# Patient Record
Sex: MALE | Race: BLACK OR AFRICAN AMERICAN | Employment: FULL TIME | URBAN - METROPOLITAN AREA
[De-identification: names, ages, dates, MRNs, and addresses within clinical notes are randomized per-mention and may not be internally consistent; named-entity substitution may affect disease eponyms.]

---

## 2018-02-10 ENCOUNTER — HOSPITAL ENCOUNTER (EMERGENCY)
Age: 33
Discharge: HOME | End: 2018-02-10
Attending: EMERGENCY MEDICINE
Payer: SELF-PAY

## 2018-02-10 ENCOUNTER — APPOINTMENT (OUTPATIENT)
Dept: GENERAL RADIOLOGY | Age: 33
End: 2018-02-10
Attending: PHYSICIAN ASSISTANT
Payer: SELF-PAY

## 2018-02-10 VITALS
OXYGEN SATURATION: 99 % | WEIGHT: 270.95 LBS | RESPIRATION RATE: 18 BRPM | HEART RATE: 79 BPM | BODY MASS INDEX: 36.7 KG/M2 | TEMPERATURE: 97.7 F | HEIGHT: 72 IN | DIASTOLIC BLOOD PRESSURE: 78 MMHG | SYSTOLIC BLOOD PRESSURE: 166 MMHG

## 2018-02-10 DIAGNOSIS — M54.42 ACUTE LEFT-SIDED LOW BACK PAIN WITH LEFT-SIDED SCIATICA: Primary | ICD-10-CM

## 2018-02-10 PROCEDURE — 99283 EMERGENCY DEPT VISIT LOW MDM: CPT

## 2018-02-10 PROCEDURE — 73502 X-RAY EXAM HIP UNI 2-3 VIEWS: CPT

## 2018-02-10 PROCEDURE — 74011250637 HC RX REV CODE- 250/637: Performed by: PHYSICIAN ASSISTANT

## 2018-02-10 RX ORDER — OXYCODONE AND ACETAMINOPHEN 5; 325 MG/1; MG/1
1 TABLET ORAL
Qty: 10 TAB | Refills: 0 | Status: SHIPPED | OUTPATIENT
Start: 2018-02-10 | End: 2018-02-13

## 2018-02-10 RX ORDER — KETOROLAC TROMETHAMINE 10 MG/1
10 TABLET, FILM COATED ORAL
Qty: 20 TAB | Refills: 0 | Status: SHIPPED | OUTPATIENT
Start: 2018-02-10

## 2018-02-10 RX ORDER — METHOCARBAMOL 500 MG/1
500 TABLET, FILM COATED ORAL 4 TIMES DAILY
Qty: 20 TAB | Refills: 0 | Status: SHIPPED | OUTPATIENT
Start: 2018-02-10

## 2018-02-10 RX ORDER — ACETAMINOPHEN 325 MG/1
650 TABLET ORAL
Status: COMPLETED | OUTPATIENT
Start: 2018-02-10 | End: 2018-02-10

## 2018-02-10 RX ADMIN — ACETAMINOPHEN 650 MG: 325 TABLET ORAL at 11:56

## 2018-02-10 NOTE — ED NOTES
Patient ambulatory to the ER c/o L hip pain that is worse when he is sitting. Denies injury. Pain radiates down his left leg.

## 2018-02-10 NOTE — DISCHARGE INSTRUCTIONS
Thank you for allowing us to provide you with excellent care today. We hope we addressed all of your concerns and needs. We strive to provide excellent quality care in the Emergency Department. Please rate us as excellent, as anything less than excellent does not meet our expectations. If you feel that you have not received excellent quality care or timely care, please ask to speak to the nurse manager. Please choose us in the future for your continued health care needs. The exam and treatment you received in the Emergency Department were for an urgent problem and are not intended as complete care. It is important that you follow-up with a doctor, nurse practitioner, or physician assistant to:  (1) confirm your diagnosis,  (2) re-evaluation of changes in your illness and treatment, and  (3) for ongoing care. If your symptoms become worse or you do not improve as expected and you are unable to reach your usual health care provider, you should return to the Emergency Department. We are available 24 hours a day. Take this sheet with you when you go to your follow-up visit. If you have any problem arranging the follow-up visit, contact [address] [place] [address] 21 [phone])    Make an appointment with your Primary Care doctor for follow up of this visit. Return to the ER if you are unable to be seen in the time recommended on your discharge instructions.

## 2018-02-10 NOTE — ED PROVIDER NOTES
EMERGENCY DEPARTMENT HISTORY AND PHYSICAL EXAM      Date: 2/10/2018  Patient Name: Kamron Uribe    History of Presenting Illness     Chief Complaint   Patient presents with    Leg Pain     Pt presents to ED for L. hip pain that radiates to buttocks x yesterday, worse today       History Provided By: Patient    HPI: Kamron Uribe, 29 y.o. male, presents ambulatory to the ED with cc of 10/10 throbbing left hip pain that began yesterday while he was at work but worsened today. The pt states that he tried resting his hip last night in hopes of alleviating the pain but notes that it did not help. Pt reports that he woke up this morning with exacerbation of his hip pain. He tried going to work and decided that his pain was unbearable so he came in to the ED. He states that his hip pain radiates down his left thigh and reports experiencing intermittent numbness and tingling in the hip as well. He notes that sitting down worsens his pain. He reports taking some Advil this morning with no relief of his sx. He denies any recent injuries or falls, However, he states that he does perform a lot of heavy lifting at work, drives long distances regularly, and crawls into tight crawl spaces. The pt denies having ever experienced similar sx before. He also denies hematuria, dysuria, Hx of back injuries, taking any medications on a daily basis, Hx of sciatica, or urinary/bowel incontinence. PCP: None    There are no other complaints, changes, or physical findings at this time. Current Outpatient Prescriptions   Medication Sig Dispense Refill    oxyCODONE-acetaminophen (PERCOCET) 5-325 mg per tablet Take 1 Tab by mouth every six (6) hours as needed for Pain for up to 3 days. Max Daily Amount: 4 Tabs. 10 Tab 0    methocarbamol (ROBAXIN) 500 mg tablet Take 1 Tab by mouth four (4) times daily. 20 Tab 0    ketorolac (TORADOL) 10 mg tablet Take 1 Tab by mouth every six (6) hours as needed for Pain.  20 Tab 0       Past History     Past Medical History:  History reviewed. No pertinent past medical history. Past Surgical History:  History reviewed. No pertinent surgical history. Family History:  History reviewed. No pertinent family history. Social History:  Social History   Substance Use Topics    Smoking status: Never Smoker    Smokeless tobacco: Never Used    Alcohol use No       Allergies:  No Known Allergies      Review of Systems   Review of Systems   Constitutional: Negative for chills, diaphoresis and fever. HENT: Negative for rhinorrhea and sore throat. Eyes: Negative for pain. Respiratory: Negative for shortness of breath, wheezing and stridor. Cardiovascular: Negative for chest pain and leg swelling. Gastrointestinal: Negative for abdominal pain, diarrhea, nausea and vomiting. Genitourinary: Negative for difficulty urinating, dysuria, flank pain and hematuria. Musculoskeletal: Positive for arthralgias and myalgias. Negative for back pain and neck stiffness. Skin: Negative for rash. Neurological: Positive for numbness. Negative for dizziness, seizures, syncope, weakness, light-headedness and headaches. Psychiatric/Behavioral: Negative for behavioral problems and confusion. All other systems reviewed and are negative. Physical Exam   Physical Exam   Constitutional: He is oriented to person, place, and time. He appears well-developed and well-nourished. No distress. HENT:   Head: Normocephalic and atraumatic. Right Ear: External ear normal.   Left Ear: External ear normal.   Nose: Nose normal.   Eyes: Conjunctivae and EOM are normal. Right eye exhibits no discharge. Left eye exhibits no discharge. No scleral icterus. Neck: Normal range of motion. Neck supple. Cardiovascular: Normal rate, regular rhythm and normal heart sounds. No murmur heard. Pulmonary/Chest: Effort normal and breath sounds normal. No stridor. No respiratory distress. He has no decreased breath sounds.  He has no wheezes. Abdominal: Soft. Bowel sounds are normal. He exhibits no distension. There is no tenderness. There is no rebound. Musculoskeletal: Normal range of motion. He exhibits no edema or tenderness. Left hip: He exhibits normal range of motion (full ROM). Left knee: He exhibits normal range of motion (full ROM). BACK: Normal spinal curvatures. No step off or deformity. NT to palpation along midline. Tenderness over the left SI joint, radiating down the left buttocks. Negative seated SLR bilaterally. Strength of the LE 5/5 and equal bilaterally. Ambulatory without difficulty. Neurological: He is alert and oriented to person, place, and time. No focal neuro deficits. NVI. Neurologically intact of UE and LE B/L  Sensation intact and symmetrical of UE and LE B/L. Strength 5/5 of UE B/L, Strength 5/5 of LE B/L. Symmetric bulk and tone of LE muscle groups. Skin: Skin is warm and dry. No rash noted. He is not diaphoretic. Psychiatric: He has a normal mood and affect. Judgment normal.   Nursing note and vitals reviewed. Diagnostic Study Results     Radiologic Studies -   XR HIP LT W OR WO PELV 2-3 VWS   Final Result      EXAM:  XR HIP LT W OR WO PELV 2-3 VWS  Clinical history: Sciatica, left hip pain  INDICATION:   sciatica, l hip pain.     COMPARISON: None.     FINDINGS: An AP view of the pelvis and a frogleg lateral view of the left hip  demonstrate no fracture, dislocation or other acute abnormality.     IMPRESSION  IMPRESSION:  No acute abnormality.         Medical Decision Making   I am the first provider for this patient. I reviewed the vital signs, available nursing notes, past medical history, past surgical history, family history and social history. Vital Signs-Reviewed the patient's vital signs.   Patient Vitals for the past 12 hrs:   Temp Pulse Resp BP SpO2   02/10/18 1103 97.7 °F (36.5 °C) 79 18 166/78 99 %     Records Reviewed: Nursing Notes and Old Medical Records    Provider Notes (Medical Decision Making):   DDX: strain, sprain, contusion, fracture, sciatica, spinal spondylosis vs spondylolisthesis, spinal stenosis. Low concern for meningitis or other infectious cause. Mechanical mechanism; reassuring exam    Patient denies fever, unexplained weight loss, specific trauma, abdominal pain, chest pain, shortness of breath, saddle anesthesia, loss of bowel or bladder, weakness, numbness or radiating pain. History and physical do not suggest infectious, neoplastic, abdominal, genitourinary, cardiopulmonary or progressive neurological etiology of back pain. Pain medication. Orthopedics/PCP referral.   Return precautions. ED Course:   Initial assessment performed. The patients presenting problems have been discussed, and they are in agreement with the care plan formulated and outlined with them. I have encouraged them to ask questions as they arise throughout their visit. 12:06 PM  Eladio Sanchez's final results have been reviewed with him. He has been counseled regarding his diagnosis. He verbally conveys understanding and agreement of the signs, symptoms, diagnosis, treatment and prognosis . Disposition:  DISCHARGE NOTE  12:06 PM  The patient has been re-evaluated and is ready for discharge. Reviewed available results with patient. Counseled pt on diagnosis and care plan. Pt has expressed understanding, and all questions have been answered. Pt agrees with plan and agrees to F/U as recommended, or return to the ED if their sxs worsen. Discharge instructions have been provided and explained to the pt, along with reasons to return to the ED. PLAN:  1. Discharge home  2. Medications as directed  3. Schedule f/u with Ortho if problem persists  4.  Return precautions reviewed    Discharge Medication List as of 2/10/2018 12:00 PM      START taking these medications    Details   oxyCODONE-acetaminophen (PERCOCET) 5-325 mg per tablet Take 1 Tab by mouth every six (6) hours as needed for Pain for up to 3 days. Max Daily Amount: 4 Tabs., Print, Disp-10 Tab, R-0      methocarbamol (ROBAXIN) 500 mg tablet Take 1 Tab by mouth four (4) times daily. , Normal, Disp-20 Tab, R-0      ketorolac (TORADOL) 10 mg tablet Take 1 Tab by mouth every six (6) hours as needed for Pain., Normal, Disp-20 Tab, R-0           2. Follow-up Information     Follow up With Details Comments [address], [place] Go to As needed, If symptoms worsen [facility]  [address]  [facility]  [phone]      [facility] [place] EM[facility] DE[facility]  As needed, If symptoms worsen [address] [place] [address]  [phone]        Return to ED if worse     Diagnosis     Clinical Impression:   1. Acute left-sided low back pain with left-sided sciatica        Attestations: This note is prepared by Benjamin Forrest, acting as Scribe for Yanely Grissom PA-C. Yanely Grissom PA-C: The scribe's documentation has been prepared under my direction and personally reviewed by me in its entirety. I confirm that the note above accurately reflects all work, treatment, procedures, and medical decision making performed by me.

## 2018-02-10 NOTE — LETTER
[address] 
[facility] EMERGENCY DEPT 
[address] [place] [address] [address] [id number] 
[phone] Work/School Note Date: 2/10/2018 To Whom It May concern: 
 
Mayelin Fall was seen and treated today in the emergency room by the following provider(s): 
Attending Provider: Chana Suresh MD 
Physician Assistant: Beronica Bernabe PA-C. Mayelin Fall may return to work on 12 February 2018. Sincerely, Beronica Bernabe PA-C